# Patient Record
Sex: MALE | Race: WHITE | NOT HISPANIC OR LATINO
[De-identification: names, ages, dates, MRNs, and addresses within clinical notes are randomized per-mention and may not be internally consistent; named-entity substitution may affect disease eponyms.]

---

## 2017-05-24 ENCOUNTER — OTHER (OUTPATIENT)
Age: 82
End: 2017-05-24

## 2017-06-01 LAB
PSA FREE FLD-MCNC: 22.8
PSA FREE SERPL-MCNC: 0.99 NG/ML
PSA SERPL-MCNC: 4.35 NG/ML

## 2017-06-02 ENCOUNTER — APPOINTMENT (OUTPATIENT)
Dept: UROLOGY | Facility: CLINIC | Age: 82
End: 2017-06-02

## 2018-01-08 LAB
PSA FREE FLD-MCNC: 19.6
PSA FREE SERPL-MCNC: 1.15 NG/ML
PSA SERPL-MCNC: 5.88 NG/ML

## 2018-01-09 ENCOUNTER — APPOINTMENT (OUTPATIENT)
Dept: UROLOGY | Facility: CLINIC | Age: 83
End: 2018-01-09
Payer: MEDICARE

## 2018-01-09 PROCEDURE — 99213 OFFICE O/P EST LOW 20 MIN: CPT

## 2018-07-20 ENCOUNTER — APPOINTMENT (OUTPATIENT)
Dept: UROLOGY | Facility: CLINIC | Age: 83
End: 2018-07-20
Payer: MEDICARE

## 2018-07-20 LAB
PSA FREE FLD-MCNC: 23.2
PSA FREE SERPL-MCNC: 1.21 NG/ML
PSA SERPL-MCNC: 5.22 NG/ML

## 2018-07-20 PROCEDURE — 99213 OFFICE O/P EST LOW 20 MIN: CPT

## 2019-01-01 ENCOUNTER — APPOINTMENT (OUTPATIENT)
Dept: UROLOGY | Facility: CLINIC | Age: 84
End: 2019-01-01
Payer: MEDICARE

## 2019-01-01 DIAGNOSIS — C61 MALIGNANT NEOPLASM OF PROSTATE: ICD-10-CM

## 2019-01-01 LAB
PSA FREE FLD-MCNC: 21 %
PSA FREE SERPL-MCNC: 1.23 NG/ML
PSA SERPL-MCNC: 5.74 NG/ML

## 2019-01-01 PROCEDURE — 99212 OFFICE O/P EST SF 10 MIN: CPT

## 2019-03-29 LAB
PSA FREE FLD-MCNC: 21 %
PSA FREE SERPL-MCNC: 1.16 NG/ML
PSA SERPL-MCNC: 5.6 NG/ML

## 2019-04-05 ENCOUNTER — APPOINTMENT (OUTPATIENT)
Dept: UROLOGY | Facility: CLINIC | Age: 84
End: 2019-04-05
Payer: MEDICARE

## 2019-04-05 DIAGNOSIS — N40.1 BENIGN PROSTATIC HYPERPLASIA WITH LOWER URINARY TRACT SYMPMS: ICD-10-CM

## 2019-04-05 PROCEDURE — 99213 OFFICE O/P EST LOW 20 MIN: CPT

## 2019-04-05 NOTE — PHYSICAL EXAM
[General Appearance - Well Developed] : well developed [General Appearance - Well Nourished] : well nourished [Abdomen Soft] : soft [Abdomen Tenderness] : non-tender [Abdomen Hernia] : no hernia was discovered [Penis Abnormality] : normal circumcised penis [Urinary Bladder Findings] : the bladder was normal on palpation [Scrotum] : the scrotum was normal [Anus Abnormality] : the anus and perineum were normal [Rectal Exam - Rectum] : digital rectal exam was normal [No Prostate Nodules] : no prostate nodules [Prostate Size ___ gm] : prostate size [unfilled] gm [] : no respiratory distress [Exaggerated Use Of Accessory Muscles For Inspiration] : no accessory muscle use [Oriented To Time, Place, And Person] : oriented to person, place, and time [No Focal Deficits] : no focal deficits

## 2019-04-05 NOTE — HISTORY OF PRESENT ILLNESS
[FreeTextEntry1] : He is following up for history of prostate cancer - several cores Dian 6 on WW. He has a small nodule where biopsy was positive. He has no change in his LUTS as detailed below. These are not bothersome.\par \par Latest PSA 5.6 21% [Urinary Incontinence] : no urinary incontinence [Urinary Retention] : no urinary retention [Urinary Urgency] : urinary urgency [Urinary Frequency] : urinary frequency [Nocturia] : nocturia [Straining] : straining [Weak Stream] : no weak stream [Intermittency] : no intermittency [Post-Void Dribbling] : no post-void dribbling [Dysuria] : no dysuria [Hematuria - Gross] : no gross hematuria

## 2019-10-11 NOTE — HISTORY OF PRESENT ILLNESS
[Urinary Urgency] : urinary urgency [Straining] : straining [Urinary Frequency] : urinary frequency [Nocturia] : nocturia [FreeTextEntry1] : He is following up for history of prostate cancer - several cores Dian 6 on WW. He has a small nodule where biopsy was positive. He has no change in his LUTS as detailed below. These are not bothersome.\par \par Latest PSA 5.7 21%  [Urinary Incontinence] : no urinary incontinence [Weak Stream] : no weak stream [Urinary Retention] : no urinary retention [Intermittency] : no intermittency [Hematuria - Gross] : no gross hematuria [Post-Void Dribbling] : no post-void dribbling [Dysuria] : no dysuria

## 2020-01-01 ENCOUNTER — APPOINTMENT (OUTPATIENT)
Dept: UROLOGY | Facility: CLINIC | Age: 85
End: 2020-01-01